# Patient Record
(demographics unavailable — no encounter records)

---

## 2024-10-22 NOTE — PHYSICAL EXAM
[FreeTextEntry1] : General: Nepali speaking,  utilized. Appearance is consistent with chronologic age. overweight Cognitive/Language: Awake, alert, and oriented to person, place, time and date.  Recent and remote memory intact.    Naming, repetition and comprehension intact. Nondysarthric.   Cranial Nerves - Eyes:   PERRL.  No ptosis/weakness of eyelid closure.   - Face: Facial sensation normal V1 - 3, no facial asymmetry.   - Ears/Nose/Throat: Hearing grossly intact b/l to finger rub.  Tongue and uvula midline.  Motor examination: Decreased ROM at lower back with every movement especially bending to L. SLT - equvivocal b/l (hamstring pain). (MRC grade R/L) 5/5 UE; 5/5 LE.  No observable drift. Normal tone and bulk. No tenderness, twitching, tremors or involuntary movements. Sensory examination: Intact to light touch and pinprick in all extremities. Reflexes:   2+ b/l biceps, triceps, patella and Achilles.    Cerebellum:   FTN/HKS intact.  No dysmetria.   Gait narrow based and normal.

## 2024-10-22 NOTE — HISTORY OF PRESENT ILLNESS
[FreeTextEntry1] : 63 yo M w PMH of HTN, Pre-DM, p/w of LBP with numbness and heaviness over his R leg since 2018 after physical activity (chopping logs) went to ED and got one shot at that time. Doesn't remember if any w/up was done or not. Denies constant pain, episodic pain triggered only with movement, some numbness over his R inner thigh which again episodic, mostly during prolong walking and during some physical activity. Denies overt weakness, night pain, wt loss, fever, sweating, saddle anesthesia, urinary or fecal incontinence, progression of his sx.  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  compliant with HTN meds, screening test, and recent prostate CA screening - neg

## 2024-10-22 NOTE — HISTORY OF PRESENT ILLNESS
[FreeTextEntry1] : 61 yo M w PMH of HTN, Pre-DM, p/w of LBP with numbness and heaviness over his R leg since 2018 after physical activity (chopping logs) went to ED and got one shot at that time. Doesn't remember if any w/up was done or not. Denies constant pain, episodic pain triggered only with movement, some numbness over his R inner thigh which again episodic, mostly during prolong walking and during some physical activity. Denies overt weakness, night pain, wt loss, fever, sweating, saddle anesthesia, urinary or fecal incontinence, progression of his sx.  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  compliant with HTN meds, screening test, and recent prostate CA screening - neg

## 2024-10-22 NOTE — ASSESSMENT
[FreeTextEntry1] : 61 yo M w PMH of HTN, Pre-DM, p/w of chronic LBP with sciatic pain on R leg numbness triggered by movement which lasted after physical activity since 2018. Neurological exam w/o overt sensory and no motor weakness. Most likely - vertebrogenic cause secondary to lumbar radiculopathy without any focal deficits at this time.     Recommendations:  - start PT (referral given) - EMG/NCV of b/l LE.  - f/u in 3 mo - will consider MRI LS spine NC if no response to PT

## 2024-10-22 NOTE — ASSESSMENT
[FreeTextEntry1] : 63 yo M w PMH of HTN, Pre-DM, p/w of chronic LBP with sciatic pain on R leg numbness triggered by movement which lasted after physical activity since 2018. Neurological exam w/o overt sensory and no motor weakness. Most likely - vertebrogenic cause secondary to lumbar radiculopathy without any focal deficits at this time.     Recommendations:  - start PT (referral given) - EMG/NCV of b/l LE.  - f/u in 3 mo - will consider MRI LS spine NC if no response to PT

## 2024-10-22 NOTE — PHYSICAL EXAM
[FreeTextEntry1] : General: Occitan speaking,  utilized. Appearance is consistent with chronologic age. overweight Cognitive/Language: Awake, alert, and oriented to person, place, time and date.  Recent and remote memory intact.    Naming, repetition and comprehension intact. Nondysarthric.   Cranial Nerves - Eyes:   PERRL.  No ptosis/weakness of eyelid closure.   - Face: Facial sensation normal V1 - 3, no facial asymmetry.   - Ears/Nose/Throat: Hearing grossly intact b/l to finger rub.  Tongue and uvula midline.  Motor examination: Decreased ROM at lower back with every movement especially bending to L. SLT - equvivocal b/l (hamstring pain). (MRC grade R/L) 5/5 UE; 5/5 LE.  No observable drift. Normal tone and bulk. No tenderness, twitching, tremors or involuntary movements. Sensory examination: Intact to light touch and pinprick in all extremities. Reflexes:   2+ b/l biceps, triceps, patella and Achilles.    Cerebellum:   FTN/HKS intact.  No dysmetria.   Gait narrow based and normal.

## 2024-10-22 NOTE — REVIEW OF SYSTEMS
[Fever] : no fever [Chills] : no chills [Suicidal] : not suicidal [Confused or Disoriented] : no confusion [Memory Lapses or Loss] : no memory loss [Decr. Concentrating Ability] : no decrease in concentrating ability [Seizures] : no convulsions [Dizziness] : no dizziness [Fainting] : no fainting [Eye Pain] : no eye pain [Red Eyes] : eyes not red [Earache] : no earache [Loss Of Hearing] : no hearing loss [Heart Rate Is Slow] : the heart rate was not slow [Heart Rate Is Fast] : the heart rate was not fast [Shortness Of Breath] : no shortness of breath [Wheezing] : no wheezing [Abdominal Pain] : no abdominal pain [Vomiting] : no vomiting [Dysuria] : no dysuria [Incontinence] : no incontinence [Skin Lesions] : no skin lesions [Skin Wound] : no skin wound [Proptosis] : no proptosis [Hot Flashes] : no hot flashes [Easy Bleeding] : no tendency for easy bleeding [Easy Bruising] : no tendency for easy bruising

## 2024-11-12 NOTE — PHYSICAL EXAM
[Normal] : well developed, well nourished, no acute distress [Normal S1, S2] : normal S1, S2 [No Murmur] : no murmur [No Rub] : no rub [Clear Lung Fields] : clear lung fields [Soft] : abdomen soft [Non Tender] : non-tender

## 2024-11-12 NOTE — HISTORY OF PRESENT ILLNESS
[FreeTextEntry1] : This is a 63 y/o M with a PMHx significant for prediabetes, HTN and obesity who presents to the clinic today for follow up. He denies chest pain, syncope, orthopnea, lower extremity edema. He was referred by his PCP who he follows with regularly and he saw her a few months ago. Feel well and no other complaints at this time.

## 2024-11-12 NOTE — END OF VISIT
[] : Resident [FreeTextEntry3] : Briefly, 62 year old man who presents for BP management. His goal is <130/80mmHg. Started amlodipine and can titrate up as tolerated. Atenolol is not a first line antihypertensive. Would recommend his PCP titrate him off it.

## 2024-11-12 NOTE — ASSESSMENT
[FreeTextEntry1] : This is a 61 y/o M with a PMHx significant for prediabetes, HTN and obesity who presents to the clinic today to establish care.   #HTN - SBP 140s today - on enalapril 20mg at home - on atenolol 100mg - add amlodipine 5mg daily - follow up with PCP   RTC prn

## 2024-11-12 NOTE — REVIEW OF SYSTEMS
[SOB] : no shortness of breath [Dyspnea on exertion] : not dyspnea during exertion [Chest Discomfort] : no chest discomfort [Palpitations] : no palpitations [Orthopnea] : no orthopnea [Cough] : no cough [Negative] : Constitutional